# Patient Record
Sex: MALE | Race: WHITE | Employment: UNEMPLOYED | URBAN - METROPOLITAN AREA
[De-identification: names, ages, dates, MRNs, and addresses within clinical notes are randomized per-mention and may not be internally consistent; named-entity substitution may affect disease eponyms.]

---

## 2019-12-06 ENCOUNTER — TRANSFERRED RECORDS (OUTPATIENT)
Dept: HEALTH INFORMATION MANAGEMENT | Facility: CLINIC | Age: 54
End: 2019-12-06

## 2019-12-06 ENCOUNTER — HOSPITAL ENCOUNTER (INPATIENT)
Facility: HOSPITAL | Age: 54
LOS: 6 days | Discharge: HOME | End: 2019-12-12
Attending: PSYCHIATRY & NEUROLOGY | Admitting: PSYCHIATRY & NEUROLOGY
Payer: MEDICAID

## 2019-12-06 DIAGNOSIS — F22 PARANOIA (H): Primary | ICD-10-CM

## 2019-12-06 LAB
ALT SERPL-CCNC: 72 IU/L (ref 6–40)
AST SERPL-CCNC: 153 IU/L (ref 10–40)
CREAT SERPL-MCNC: 0.83 MG/DL (ref 0.7–1.2)
GFR SERPL CREATININE-BSD FRML MDRD: >60 ML/MIN/1.73M2
GLUCOSE SERPL-MCNC: 148 MG/DL (ref 70–99)
POTASSIUM SERPL-SCNC: 3.7 MEQ/L (ref 3.4–5.1)

## 2019-12-06 PROCEDURE — 12400000 ZZH R&B MH

## 2019-12-06 PROCEDURE — 99223 1ST HOSP IP/OBS HIGH 75: CPT | Performed by: NURSE PRACTITIONER

## 2019-12-06 RX ORDER — HYDROXYZINE HYDROCHLORIDE 25 MG/1
25-50 TABLET, FILM COATED ORAL EVERY 4 HOURS PRN
Status: DISCONTINUED | OUTPATIENT
Start: 2019-12-06 | End: 2019-12-12 | Stop reason: HOSPADM

## 2019-12-06 RX ORDER — OLANZAPINE 10 MG/1
10 TABLET ORAL 3 TIMES DAILY PRN
Status: DISCONTINUED | OUTPATIENT
Start: 2019-12-06 | End: 2019-12-12 | Stop reason: HOSPADM

## 2019-12-06 RX ORDER — ALUMINA, MAGNESIA, AND SIMETHICONE 2400; 2400; 240 MG/30ML; MG/30ML; MG/30ML
30 SUSPENSION ORAL EVERY 4 HOURS PRN
Status: DISCONTINUED | OUTPATIENT
Start: 2019-12-06 | End: 2019-12-12 | Stop reason: HOSPADM

## 2019-12-06 RX ORDER — OLANZAPINE 10 MG/2ML
10 INJECTION, POWDER, FOR SOLUTION INTRAMUSCULAR 3 TIMES DAILY PRN
Status: DISCONTINUED | OUTPATIENT
Start: 2019-12-06 | End: 2019-12-12 | Stop reason: HOSPADM

## 2019-12-06 RX ORDER — ACETAMINOPHEN 325 MG/1
650 TABLET ORAL EVERY 4 HOURS PRN
Status: DISCONTINUED | OUTPATIENT
Start: 2019-12-06 | End: 2019-12-12 | Stop reason: HOSPADM

## 2019-12-07 LAB — GLUCOSE BLDC GLUCOMTR-MCNC: 121 MG/DL (ref 70–99)

## 2019-12-07 PROCEDURE — 00000146 ZZHCL STATISTIC GLUCOSE BY METER IP

## 2019-12-07 PROCEDURE — 25000132 ZZH RX MED GY IP 250 OP 250 PS 637: Performed by: NURSE PRACTITIONER

## 2019-12-07 PROCEDURE — 12400000 ZZH R&B MH

## 2019-12-07 RX ORDER — ASPIRIN 81 MG/1
81 TABLET ORAL DAILY
COMMUNITY

## 2019-12-07 RX ORDER — ALBUTEROL SULFATE 90 UG/1
2 AEROSOL, METERED RESPIRATORY (INHALATION) EVERY 4 HOURS PRN
COMMUNITY

## 2019-12-07 RX ORDER — QUETIAPINE FUMARATE 200 MG/1
200 TABLET, FILM COATED ORAL EVERY MORNING
Status: ON HOLD | COMMUNITY
End: 2019-12-12

## 2019-12-07 RX ORDER — ASPIRIN 81 MG/1
81 TABLET ORAL DAILY
Status: DISCONTINUED | OUTPATIENT
Start: 2019-12-07 | End: 2019-12-12 | Stop reason: HOSPADM

## 2019-12-07 RX ORDER — FINASTERIDE 5 MG/1
5 TABLET, FILM COATED ORAL DAILY
COMMUNITY

## 2019-12-07 RX ORDER — BUPROPION HYDROCHLORIDE 300 MG/1
300 TABLET ORAL DAILY
Status: ON HOLD | COMMUNITY
End: 2019-12-12

## 2019-12-07 RX ORDER — DOCUSATE SODIUM 100 MG/1
200 CAPSULE, LIQUID FILLED ORAL 2 TIMES DAILY
Status: ON HOLD | COMMUNITY
End: 2019-12-12

## 2019-12-07 RX ORDER — MIRTAZAPINE 30 MG/1
60 TABLET, FILM COATED ORAL AT BEDTIME
Status: ON HOLD | COMMUNITY
End: 2019-12-12

## 2019-12-07 RX ORDER — ATORVASTATIN CALCIUM 40 MG/1
40 TABLET, FILM COATED ORAL DAILY
COMMUNITY

## 2019-12-07 RX ORDER — RISPERIDONE 3 MG/1
3 TABLET ORAL AT BEDTIME
Status: ON HOLD | COMMUNITY
End: 2019-12-12

## 2019-12-07 RX ORDER — TAMSULOSIN HYDROCHLORIDE 0.4 MG/1
0.8 CAPSULE ORAL DAILY
Status: DISCONTINUED | OUTPATIENT
Start: 2019-12-07 | End: 2019-12-12 | Stop reason: HOSPADM

## 2019-12-07 RX ORDER — CELECOXIB 100 MG/1
200 CAPSULE ORAL 2 TIMES DAILY
Status: DISCONTINUED | OUTPATIENT
Start: 2019-12-07 | End: 2019-12-12 | Stop reason: HOSPADM

## 2019-12-07 RX ORDER — CELECOXIB 200 MG/1
200 CAPSULE ORAL 2 TIMES DAILY
COMMUNITY

## 2019-12-07 RX ORDER — FINASTERIDE 5 MG/1
5 TABLET, FILM COATED ORAL DAILY
Status: DISCONTINUED | OUTPATIENT
Start: 2019-12-07 | End: 2019-12-12 | Stop reason: HOSPADM

## 2019-12-07 RX ORDER — TAMSULOSIN HYDROCHLORIDE 0.4 MG/1
0.8 CAPSULE ORAL DAILY
COMMUNITY

## 2019-12-07 RX ORDER — ATORVASTATIN CALCIUM 40 MG/1
40 TABLET, FILM COATED ORAL DAILY
Status: DISCONTINUED | OUTPATIENT
Start: 2019-12-07 | End: 2019-12-12 | Stop reason: HOSPADM

## 2019-12-07 RX ORDER — CIPROFLOXACIN 500 MG/1
500 TABLET, FILM COATED ORAL 2 TIMES DAILY
Status: ON HOLD | COMMUNITY
Start: 2019-12-04 | End: 2019-12-12

## 2019-12-07 RX ORDER — QUETIAPINE FUMARATE 300 MG/1
600 TABLET, FILM COATED ORAL AT BEDTIME
Status: ON HOLD | COMMUNITY
End: 2019-12-12

## 2019-12-07 RX ADMIN — Medication 1 TABLET: at 20:33

## 2019-12-07 RX ADMIN — OLANZAPINE 10 MG: 10 TABLET, FILM COATED ORAL at 16:05

## 2019-12-07 RX ADMIN — FINASTERIDE 5 MG: 5 TABLET, FILM COATED ORAL at 13:35

## 2019-12-07 RX ADMIN — OMEPRAZOLE 20 MG: 20 CAPSULE, DELAYED RELEASE ORAL at 11:37

## 2019-12-07 RX ADMIN — CELECOXIB 200 MG: 100 CAPSULE ORAL at 11:37

## 2019-12-07 RX ADMIN — ATORVASTATIN CALCIUM 40 MG: 40 TABLET, FILM COATED ORAL at 11:37

## 2019-12-07 RX ADMIN — Medication 1 TABLET: at 11:38

## 2019-12-07 RX ADMIN — HYDROXYZINE HYDROCHLORIDE 50 MG: 25 TABLET, FILM COATED ORAL at 16:05

## 2019-12-07 RX ADMIN — ASPIRIN 81 MG: 81 TABLET, COATED ORAL at 11:44

## 2019-12-07 RX ADMIN — METFORMIN HYDROCHLORIDE 500 MG: 500 TABLET, FILM COATED ORAL at 18:27

## 2019-12-07 RX ADMIN — TAMSULOSIN HYDROCHLORIDE 0.8 MG: 0.4 CAPSULE ORAL at 11:38

## 2019-12-07 ASSESSMENT — ACTIVITIES OF DAILY LIVING (ADL)
SWALLOWING: 0-->SWALLOWS FOODS/LIQUIDS WITHOUT DIFFICULTY
COGNITION: 0 - NO COGNITION ISSUES REPORTED
ORAL_HYGIENE: INDEPENDENT
RETIRED_COMMUNICATION: 0-->UNDERSTANDS/COMMUNICATES WITHOUT DIFFICULTY
HYGIENE/GROOMING: INDEPENDENT
DRESS: 0-->INDEPENDENT
AMBULATION: 0-->INDEPENDENT
FALL_HISTORY_WITHIN_LAST_SIX_MONTHS: YES
TOILETING: 0-->INDEPENDENT
NUMBER_OF_TIMES_PATIENT_HAS_FALLEN_WITHIN_LAST_SIX_MONTHS: 2
DRESS: SCRUBS (BEHAVIORAL HEALTH);INDEPENDENT
TRANSFERRING: 0-->INDEPENDENT
RETIRED_EATING: 0-->INDEPENDENT
BATHING: 0-->INDEPENDENT

## 2019-12-07 NOTE — PLAN OF CARE
"  Problem: Adult Behavioral Health Plan of Care  Goal: Patient-Specific Goal (Individualization)  Description  Patient will sleep 6-8 hours per night.  Patient will eat 50% of meals.   Patient will attend 50% of groups.  Patient will remain free from self harm or injury during hospital stay.   12/7/2019 1626 by Sonia Parks, RN  Outcome: No Change  Note:   Pt irritable at start of the shift, demanding to have his clothes to leave. Pt placed on 72 hr hold at 1640 by provider April P. Pt stated \"I'm fucking pissed off. People are telling me I stole my family's fucking money. I'm freaking out thinking people are trying to kill me. I don't know what the fuck to say or believe. Just get me a gun right now, so I can go boom\" - pt pointing at his head making gun-like hand gesture. Pt contracts for safety on the unit. Speech was delayed - pt endorsed auditory hallucinations.   1605 - Pt was given 10 mg of PRN Zyprexa for agitation and 50 mg of PRN Hydroxyzine for anxiety. Pt much calmer upon reassessment. He ate 75% of supper tonight.     Face to face end of shift report communicated to oncoming RN.      Problem: Suicidal Behavior  Goal: Suicidal Behavior is Absent or Managed  Description  Patient will be free from self harm and injury during hospital stay.     12/7/2019 1626 by Sonia Parks, RN  Outcome: No Change  Note:   Pt made suicidal statements; however, contracts for safety on the unit.      Problem: Fall Injury Risk  Goal: Absence of Fall and Fall-Related Injury  Description  Patient will be free from injury or harm this hospital stay.   Patient will be free from falls during hospital stay.    12/7/2019 1626 by Sonia Parks, RN  Outcome: Improving  Note:   Pt remained free from falls. Gait was balanced and steady.      "
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
## 2019-12-07 NOTE — PLAN OF CARE
ADMISSION NOTE    Reason for admission -Paranoid schizophrenia , off medications.  Safety concerns -none reported.  Risk for or history of violence -none reported.   Full skin assessment: completed    Patient arrived on unit from [facility] accompanied by ambulance crew and security on 12/6/2019  11:08 PM.   Status on arrival: alert, oriented and   See flow sheet for vital signs. Patient given tour of unit and Welcome to  unit papers given to patient, wanding completed, belongings inventoried, and admission assessment started.   Patient's legal status on arrival is 72 hour hold. Appropriate legal rights discussed with and copy given to patient. Patient Bill of Rights discussed with and copy given to patient.   Patient denies SI, HI, and thoughts of self harm and contracts for safety while on unit.      Ruma Mak RN  12/6/2019  11:21 PM

## 2019-12-07 NOTE — PROGRESS NOTES
12/07/19 0058   Patient Belongings   Did you bring any home meds/supplements to the hospital?  Yes   Disposition of meds  Sent to security/pharmacy per site process   Patient Belongings remains with patient   Patient Belongings Remaining with Patient clothing  (boxer pants tshirt belt socks hoodie cig an lighter jacket makeupcase wallet three dollars and 50 cents)   Belongings Search Yes   Clothing Search Yes   Second Staff luke left from afternoons   Comment n/a   List items sent to safe: twenty cash visa card ins.card d.l. s.s.cards ebt card  All other belongings put in assigned cubby in belongings room.       I have reviewed my belongings list on admission and verify that it is correct.     Patient signature_______________________________    Second staff witness (if patient unable to sign) ______________________________       I have received all my belongings at discharge.    Patient signature________________________________    Rea DRAKE   12/7/2019  1:02 AM

## 2019-12-07 NOTE — PLAN OF CARE
"Pt denies SI/HI and endorses chronic auditory hallucinations. PT reports that the voices are \"hard to explain, sometimes they are annoying and tell me to do things.\" PT delayed during conversation, possibly responding to internal stimuli. PT has been refusing meals since arrival. PT reported he \"feels depressed\" and that is why he is not eating. PT educated on diabetic diagnosis and replied \" Oh, I'm diabetic, I guess I forgot. I didn't know that.\" PT then agreed to drink his juice and continue to have milk or juice throughout shift. PT spoke with writer about superficial topics, such as [place] and tattoos but would not expand on personal assessment questions and gave brief answers regarding mental health.     PT denies pain. PT requested shower supplies and showered.   PT calm, with a flat affect.     AM scheduled metformin held r/t patient refusing food and beverages.     PT had /96, PT came out of room, ate lunch and had fluids. PT had scheduled medications.  PT BP was 123/62 on reassessment at 1350  Pt compliant with medication administrations.       Face to face end of shift report communicated to Oncoming RN    Yany Preston RN  12/7/2019  2:32 PM          Problem: Adult Behavioral Health Plan of Care  Goal: Patient-Specific Goal (Individualization)  Description  Patient will sleep 6-8 hours per night.  Patient will eat 50% of meals.   Patient will attend 50% of groups.  Patient will remain free from self harm or injury during hospital stay.   12/7/2019 1039 by Yany Preston, RN  Outcome: No Change  Note:            Problem: Suicidal Behavior  Goal: Suicidal Behavior is Absent or Managed  Description  Patient will be free from self harm and injury during hospital stay.     12/7/2019 1039 by Yany Preston, RN  Outcome: No Change     Problem: Fall Injury Risk  Goal: Absence of Fall and Fall-Related Injury  Description  Patient will be free from injury or harm this hospital stay.   Patient " will be free from falls during hospital stay.    12/7/2019 1039 by Yany Preston, RN  Outcome: Improving

## 2019-12-07 NOTE — H&P
"Psychiatric Eval/H&P    Patient Name: Tobi Jeffery   YOB: 1965  Age: 54 year old  [id number]    Primary Physician: No primary care provider on file.   Completed by ROMULO Hill   none  AR[facility]MS none  Primary psychiatrist/NP none currently states he was seen Dr. Arreaga in the past  Therapist none  Family contact does not wish to give anyone information          CC: \"I do not know why I am here\"    HPI   Tobi Jeffery is a 54 year old  male who was brought in by the [place] police department to the [facility] emergency room after he was kicked out of detox today.  He was initially brought to detox after police have found him walking from [facility] to [place] a few days ago.  Detox believed that his symptoms were secondary to schizophrenia and he has a known diagnosis of this and not due to any intoxication or withdrawal of substance or alcohol.  He had apparently been seeing a local psychiatrist who retired last year and has not been compliant with any of his medications.  He states he does not want to restart his medications because they make his symptoms worse.    He was recently in the [facility] emergency room within the past 2 days due to responding to hallucinationsThough it does not appear that he was admitted anywhere.  The [facility] emergency room physician thought there was a possibility that was methamphetamine intoxication though he denies any methamphetamine use.     He was cooperative in the [place] emergency room though was appearing very preoccupied.         When I met with Tobi he laid in bed and almost made no eye contact with me.  He stared straight at the ceiling while he was laying in bed.  He appeared to have thought blocking though was able to answer some questions in complete sentence though this did not occur frequently.  He did not initiate any conversation at all.  He did not ask me any questions or contribute to any part of the " "conversation.  Staff did tell me that he had not eaten since he has been here in his breakfast was still sitting his side table.  I asked him if he was hearing voices telling him not to eat and he said that he was.  I asked him if he was hearing voices telling him to kill himself and he said that he was.  He denies having intent to harm himself.  At one point he does state \"I am not doing too well\" he could not elaborate on this at all.  He is extremely blunted.  I asked him if he had like the past psychiatrist that he had seen.  It appears that he did not follow-up with anyone else after this psychiatrist retired.  He states \"yeah he was all right\" I asked if he like the medications he was on when he saw that psychiatrist and he said yes.  I asked if we could restart those medications he was on in the past and he said \"no they made me worse\" I asked if they made his voices seem worse and he stated yes.  It appears at one point he was on Wellbutrin which could have contributed to her worsening of the voices.  It appears he was also on Risperdal and Seroquel though he has not taken them in quite some time at least a year.  I asked him if he believes the voices that he is hearing could be secondary to mental illness and he stated \"I am not sure\".  He does state that he drinks alcohol though when I asked him how frequently he drinks he does not answer.  I asked him if he drinks at least weekly and he says yes.  His liver enzymes are elevated and he states that he has never been diagnosed with hepatitis when I asked him.  I asked if his liver enzymes could be elevated from alcohol and he says \"maybe\".  I asked him if he could think of any way that I could help him and he said \"no you can help me\" I said that there is a possibility medications could help some of his symptoms such as the voices and he said that there is \"nothing that can help me\" he looks quite anxious and preoccupied when he stares at the ceiling.  He " "denies any thoughts to harm other people.     Past Psychiatric History:      He has not signed a release of information for care everywhere and I am unable to see if there is any past hospitalizations for mental health.  He has no hospitalizations here in [facility] or under the media tab in the scanned charts from old history.    He does tell me that he has been under civil commitments \"maybe twice\" I asked him if he had ever been to [facility] and he believes that he has.  I asked if I could get him to sign a release so I could obtain those records and he agreed to do this though I am concerned he will be too paranoid to sign the paper.      Social History:     He was most recently living in [place] that was unable to return there.  I am unsure who he was living with.  I believe he is on disability.  It is unclear if he has any family in [place] area.  He will not give us any family members names and has listed himself as confidential    Chemical Use History: He drinks at least weekly per his report though for the past few days he was in detox as the police believed he was intoxicated or withdrawing and detox had him sent here because they did not believe he had any symptoms that his symptoms were secondary to schizophrenia    He does smoke marijuana     Family Psychiatric History: Unknown     Medical History and ROS    Prior to Admission medications    Not on File     Allergies not on file  No past medical history on file.  No past surgical history on file.    Current Medications none    Past Psychiatric Medications Tried Risperdal and Seroquel and Wellbutrin \"made my symptoms worse        Physical Exam     Constitutional: oriented to person, place, and time.  appears well-developed and well-nourished.   HENT:   Head: Normocephalic and atraumatic.   Right Ear: External ear normal.   Left Ear: External ear normal.   Nose: Nose normal.   Mouth/Throat: Oropharynx is clear and moist. No " oropharyngeal exudate.   Eyes: Conjunctivae and EOM are normal. Pupils are equal, round, and reactive to light. Right eye exhibits no discharge. Left eye exhibits no discharge. No scleral icterus.   Neck: Normal range of motion. Neck supple. No JVD present. No tracheal deviation present. No thyromegaly present.   Cardiovascular: Normal rate, regular rhythm, normal heart sounds and intact distal pulses. Exam reveals no gallop and no friction rub.   No murmur heard.  Pulmonary/Chest: Effort normal and breath sounds normal. No stridor. No respiratory distress.  no wheezes. no rales. no tenderness.   Abdominal: Soft. Bowel sounds are normal.  no distension and no mass. There is no tenderness. There is no rebound and no guarding.  Skin: Dry, intact, no open areas, rashes, moles of concern        Review of Systems:  A 10 point review of systems was negative though he is an extremely poor historian         MSE/PSYCH  PSYCHIATRIC EXAM  There were no vitals taken for this visit.  -Appearance/Behavior: Extremely disheveled and malodorous    -Motor: Psychomotor retardation  -Gait: I did not observe his gait he was laying in bed  -Abnormal involuntary movements: None noted  -Mood: Constricted though anxious at times  -Affect: Blunted  -Speech: Very delayed and monotone does not offer much in conversation        -Thought process/associations: Thought blocking.  -Thought content: paranoid delusions  -Perceptual disturbances: Frequent hallucinations..              -Suicidal/Homicidal Ideation: Suicidal thoughts with no intent denies homicidal thoughts  -Judgment: Poor.  -Insight: Poor.  *Orientation: time and person.  *Memory: Fair  *Attention: Limited  *Language: fluent, no aphasias, able to repeat phrases and name objects. Vocab intact.  *Fund of information: Low average  *Cognitive functioning estimate: 2 - very impaired.     Labs: His AST was 153 his ALT was 72.  Both elevated.  His glucose is slightly elevated at 148 with  "this was not fasting.  His white blood cell count was also slightly elevated at 11.2 though does not have any symptoms of infection     Assessment/Impression: This is a 54 year old yo male who was brought in by local police from detox.  They had brought him to detox 3 days ago and detox staff did not believe that his behaviors were secondary to any substance intoxication or withdrawal and did tell police to bring him in for a psychiatric evaluation.  He has been disorganized and responding to internal stimuli.  He does have a history of schizophrenia and has not been on his medications for approximately a year possibly more.  He is a very poor historian and answers minimally.  He is difficult to assess.  He is extremely malodorous and has not taken care of himself.  He is hearing voices telling him not to eat as well as kill himself though he does not have intent.  He makes no eye contact and offers almost nothing in conversation or answers.  Initially he states the medications used to be on were \"okay\" though he then states that they made him worse would not elaborate on this.  He is listed himself as a do not acknowledge and will not give me any family to contact.  He is homeless and having command hallucinations he is unable to take care of himself and I did tell him there is a high possibility that I may have to fill out a petition for commitment and have him screened by the [facility] due to my concerns for his safety.    Educated regarding medication indications, risks, benefits, side effects, contraindications and possible interactions. Verbally expressed understanding.     DX:      Unspecified psychotic disorder  Rule out schizophrenia  Rule out schizoaffective disorder, bipolar type  Alcohol use disorder moderate to severe  Marijuana use disorder moderate to severe    Plan:     Labs Needed:    Hep c will be ordered      Med Changes:  I would like to restart Risperdal though he states that the medications \"made " "him worse\".  He does not want to take any medications.  I will have some as needed Ativan and Zyprexa available and nursing is offering Zyprexa on an as-needed basis at this time        DC Planning:  High likelihood I will have to fill out a petition for civil commitment due to his command hallucinations telling him to kill himself as well as his inability to care for himself at all.      Anticipated length of stay greater than 5 days       "

## 2019-12-08 LAB — GLUCOSE BLDC GLUCOMTR-MCNC: 120 MG/DL (ref 70–99)

## 2019-12-08 PROCEDURE — 25000132 ZZH RX MED GY IP 250 OP 250 PS 637: Performed by: NURSE PRACTITIONER

## 2019-12-08 PROCEDURE — 00000146 ZZHCL STATISTIC GLUCOSE BY METER IP

## 2019-12-08 PROCEDURE — 12400000 ZZH R&B MH

## 2019-12-08 PROCEDURE — 99233 SBSQ HOSP IP/OBS HIGH 50: CPT | Performed by: NURSE PRACTITIONER

## 2019-12-08 RX ORDER — ALBUTEROL SULFATE 0.83 MG/ML
2.5 SOLUTION RESPIRATORY (INHALATION) EVERY 4 HOURS PRN
Status: DISCONTINUED | OUTPATIENT
Start: 2019-12-08 | End: 2019-12-12 | Stop reason: HOSPADM

## 2019-12-08 RX ADMIN — ATORVASTATIN CALCIUM 40 MG: 40 TABLET, FILM COATED ORAL at 08:02

## 2019-12-08 RX ADMIN — METFORMIN HYDROCHLORIDE 500 MG: 500 TABLET, FILM COATED ORAL at 08:02

## 2019-12-08 RX ADMIN — FLUTICASONE FUROATE AND VILANTEROL TRIFENATATE 1 PUFF: 100; 25 POWDER RESPIRATORY (INHALATION) at 10:27

## 2019-12-08 RX ADMIN — METFORMIN HYDROCHLORIDE 500 MG: 500 TABLET, FILM COATED ORAL at 17:44

## 2019-12-08 RX ADMIN — OMEPRAZOLE 20 MG: 20 CAPSULE, DELAYED RELEASE ORAL at 08:02

## 2019-12-08 RX ADMIN — ASPIRIN 81 MG: 81 TABLET, COATED ORAL at 08:02

## 2019-12-08 RX ADMIN — Medication 1 TABLET: at 21:34

## 2019-12-08 RX ADMIN — HYDROXYZINE HYDROCHLORIDE 50 MG: 25 TABLET, FILM COATED ORAL at 18:15

## 2019-12-08 RX ADMIN — TAMSULOSIN HYDROCHLORIDE 0.8 MG: 0.4 CAPSULE ORAL at 08:02

## 2019-12-08 RX ADMIN — OLANZAPINE 10 MG: 10 TABLET, FILM COATED ORAL at 18:15

## 2019-12-08 RX ADMIN — CELECOXIB 200 MG: 100 CAPSULE ORAL at 21:34

## 2019-12-08 RX ADMIN — FINASTERIDE 5 MG: 5 TABLET, FILM COATED ORAL at 08:02

## 2019-12-08 RX ADMIN — Medication 1 TABLET: at 08:02

## 2019-12-08 RX ADMIN — CELECOXIB 200 MG: 100 CAPSULE ORAL at 08:02

## 2019-12-08 ASSESSMENT — MIFFLIN-ST. JEOR: SCORE: 1910.84

## 2019-12-08 ASSESSMENT — ACTIVITIES OF DAILY LIVING (ADL)
LAUNDRY: UNABLE TO COMPLETE
DRESS: SCRUBS (BEHAVIORAL HEALTH);INDEPENDENT
HYGIENE/GROOMING: INDEPENDENT
ORAL_HYGIENE: INDEPENDENT
ORAL_HYGIENE: INDEPENDENT
DRESS: SCRUBS (BEHAVIORAL HEALTH);INDEPENDENT
HYGIENE/GROOMING: INDEPENDENT

## 2019-12-08 NOTE — PROGRESS NOTES
"[facility]  Psychiatric Progress Note      Impression:     Tobi is more cooperative and he is less delayed in his speech today.  He tells me that he is ready to start taking medication again.  He tells me that his voices are not as bad as they were yesterday.  He does admit to using methamphetamines a few days prior to coming in and does believe they made his hallucinations and \"racing thoughts\" worse.  He states the hallucinations are still there though they are not as bad.  They are no longer telling him not to eat.  He has been eating since last night.  Last night he did demand to leave and I did have to place him on a 72-hour hold.  His affect is still very flat and he is delayed in his speech.  His thought blocking has improved a bit though he is still quite delayed.  He still endorses suicidal thoughts though states it is better than it was and has no intent or plan.  We discussed his previous living situation and he states that he has lived in [facility] [place] [facility] in the past and he is hoping that there is one in [place] that he can move to.  He does have friends and knows [place] very well.    Educated regarding medication indications, risks, benefits, side effects, contraindications and possible interactions. Verbally expressed understanding.        DIagnoses:   Unspecified psychotic disorder  Rule out schizophrenia  Rule out schizoaffective disorder, bipolar type  Methamphetamine use dsiorder, moderate  Alcohol use disorder moderate to severe  Marijuana use disorder moderate to severe      Attestation:  Patient has been seen and evaluated by me,  Jackie Barker NP          Interim History:   The patient's care was discussed with the treatment team and chart notes were reviewed.          Medications:     Current Facility-Administered Medications Ordered in Epic   Medication Dose Route Frequency Last Rate Last Dose     acetaminophen (TYLENOL) tablet 650 mg  650 mg " "Oral Q4H PRN         albuterol (PROVENTIL) neb solution 2.5 mg  2.5 mg Nebulization Q4H PRN         alum & mag hydroxide-simethicone (MYLANTA ES/MAALOX  ES) suspension 30 mL  30 mL Oral Q4H PRN         aspirin EC tablet 81 mg  81 mg Oral Daily   81 mg at 12/08/19 0802     atorvastatin (LIPITOR) tablet 40 mg  40 mg Oral Daily   40 mg at 12/08/19 0802     calcium carbonate 600 mg-vitamin D 400 units (CALTRATE) per tablet 1 tablet  1 tablet Oral BID   1 tablet at 12/08/19 0802     celecoxib (celeBREX) capsule 200 mg  200 mg Oral BID   200 mg at 12/08/19 0802     finasteride (PROSCAR) tablet 5 mg  5 mg Oral Daily   5 mg at 12/08/19 0802     fluticasone-vilanterol (BREO ELLIPTA) 100-25 MCG/INH inhaler 1 puff  1 puff Inhalation Daily   1 puff at 12/08/19 1027     hydrOXYzine (ATARAX) tablet 25-50 mg  25-50 mg Oral Q4H PRN   50 mg at 12/07/19 1605     magnesium hydroxide (MILK OF MAGNESIA) suspension 30 mL  30 mL Oral At Bedtime PRN         metFORMIN (GLUCOPHAGE) tablet 500 mg  500 mg Oral BID w/meals   500 mg at 12/08/19 0802     nicotine (NICORETTE) gum 2-4 mg  2-4 mg Buccal Q1H PRN         OLANZapine (zyPREXA) injection 10 mg  10 mg Intramuscular TID PRN        Or     OLANZapine (zyPREXA) tablet 10 mg  10 mg Oral TID PRN   10 mg at 12/07/19 1605     omeprazole (priLOSEC) CR capsule 20 mg  20 mg Oral Daily   20 mg at 12/08/19 0802     tamsulosin (FLOMAX) capsule 0.8 mg  0.8 mg Oral Daily   0.8 mg at 12/08/19 0802     No current Epic-ordered outpatient medications on file.              10 point ROS negative        Allergies:   No Known Allergies         Psychiatric Examination:   /86   Pulse 86   Temp 96.9  F (36.1  C) (Tympanic)   Resp 16   Ht 1.727 m (5' 8\")   Wt 109.6 kg (241 lb 11.2 oz)   SpO2 95%   BMI 36.75 kg/m    Weight is 241 lbs 11.2 oz  Body mass index is 36.75 kg/m .    Appearance:  awake, alert and slightly unkempt  Attitude:  guarded  Eye Contact:  fair  Mood:  depressed  Affect:  intensity is " blunted  Speech:  decreased prosody  Psychomotor Behavior:  no evidence of tardive dyskinesia, dystonia, or tics  Thought Process:  evidence of thought blocking present  Associations:  no loose associations  Thought Content:  passive suicidal ideation present and auditory hallucinations present  Insight:  limited  Judgment:  poor  Oriented to:  time, person, and place  Attention Span and Concentration:  limited  Recent and Remote Memory:  fair  Fund of Knowledge: low-normal  Muscle Strength and Tone: normal  Gait and Station: Normal           Labs:   None needed at this time           Plan:     Start risperdal 1 mg hs

## 2019-12-08 NOTE — PLAN OF CARE
Face to face shift report received from Sonia FERNANDO RN.       Problem: Adult Behavioral Health Plan of Care  Goal: Patient-Specific Goal (Individualization)  Description  Patient will sleep 6-8 hours per night.  Patient will eat 50% of meals.   Patient will attend 50% of groups.  Patient will remain free from self harm or injury during hospital stay.   12/8/2019 0307 by Latanya Gavin RN  Outcome: No Change  Note: Celebrex not given due to pt being asleep. Pt has been in bed with eyes closed and regular respirations. Slept 7 hours this shift. 15 minute and PRN checks all night. No complaints offered. Denies that he is and does not appear to be experiencing any symptoms of withdrawal. Will continue to monitor. Accucheck this .          Problem: Cognitive Impairment (Psychotic Signs/Symptoms)  Goal: Improved Thought Clarity and Organization (Psychotic Signs/Symptoms)  Description  Patient will be able to participate in reality based conversation.    Outcome: No Change       Problem: Suicidal Behavior  Goal: Suicidal Behavior is Absent or Managed  Description  Patient will be free from self harm and injury during hospital stay.     12/8/2019 0307 by Latanya Gavin RN  Outcome: Improving  Free from self harm and injury this shift.      Problem: Fall Injury Risk  Goal: Absence of Fall and Fall-Related Injury  Description  Patient will be free from injury or harm this hospital stay.   Patient will be free from falls during hospital stay.    12/8/2019 0307 by Latanya Gavin RN  Outcome: Improving   Free from falls this shift.     Face to face end of shift report to be communicated to oncoming RN.

## 2019-12-08 NOTE — PLAN OF CARE
"The beginning of shift patient was carrying around his hold paperwork and asking floor staff to leave. PT has not asked since and put paper back     PT has been quiet and withdrawn throughout shift but out in the lounge for meals and in the lounge watching football. PT has a blunt affect but appears less irritable than yesterday. PT also attended group passively. PT is brief with assessment questions. PT does not speak/initiate or participate in conversation unless spoken to or questioned. PT endorses chronic auditory voices that \"are ok, I guess.\" today. PT reports feeling suicidal but unable to explain why or define a plan and agrees to safety plan. PT is calm, cooperative with medications.    PT has no c/o pain,  PT steady and balanced on feet. PT removed fall band and refused replacement fall band.   PT showered.     Face to face end of shift report communicated to oncoming RN    Yany Preston RN  12/8/2019  2:31 PM                Problem: Adult Behavioral Health Plan of Care  Goal: Patient-Specific Goal (Individualization)  Description  Patient will sleep 6-8 hours per night.  Patient will eat 50% of meals.   Patient will attend 50% of groups.  Patient will remain free from self harm or injury during hospital stay.   12/8/2019 1245 by Yany Preston, RN  Outcome: Improving  Note:          Problem: Suicidal Behavior  Goal: Suicidal Behavior is Absent or Managed  Description  Patient will be free from self harm and injury during hospital stay.     12/8/2019 1245 by Yany Preston, RN  Outcome: Improving     Problem: Fall Injury Risk  Goal: Absence of Fall and Fall-Related Injury  Description  Patient will be free from injury or harm this hospital stay.   Patient will be free from falls during hospital stay.    12/8/2019 1245 by Yany Preston, RN  Outcome: Improving     "

## 2019-12-09 LAB — GLUCOSE BLDC GLUCOMTR-MCNC: 125 MG/DL (ref 70–99)

## 2019-12-09 PROCEDURE — 25000132 ZZH RX MED GY IP 250 OP 250 PS 637: Performed by: NURSE PRACTITIONER

## 2019-12-09 PROCEDURE — 12400000 ZZH R&B MH

## 2019-12-09 PROCEDURE — 00000146 ZZHCL STATISTIC GLUCOSE BY METER IP

## 2019-12-09 RX ORDER — RISPERIDONE 0.5 MG/1
0.5 TABLET ORAL 2 TIMES DAILY
Status: DISCONTINUED | OUTPATIENT
Start: 2019-12-09 | End: 2019-12-10

## 2019-12-09 RX ADMIN — RISPERIDONE 0.5 MG: 0.5 TABLET ORAL at 20:37

## 2019-12-09 RX ADMIN — METFORMIN HYDROCHLORIDE 500 MG: 500 TABLET, FILM COATED ORAL at 09:02

## 2019-12-09 RX ADMIN — CELECOXIB 200 MG: 100 CAPSULE ORAL at 09:02

## 2019-12-09 RX ADMIN — Medication 1 TABLET: at 20:37

## 2019-12-09 RX ADMIN — ATORVASTATIN CALCIUM 40 MG: 40 TABLET, FILM COATED ORAL at 09:02

## 2019-12-09 RX ADMIN — FINASTERIDE 5 MG: 5 TABLET, FILM COATED ORAL at 09:51

## 2019-12-09 RX ADMIN — ASPIRIN 81 MG: 81 TABLET, COATED ORAL at 09:03

## 2019-12-09 RX ADMIN — TAMSULOSIN HYDROCHLORIDE 0.8 MG: 0.4 CAPSULE ORAL at 09:03

## 2019-12-09 RX ADMIN — CELECOXIB 200 MG: 100 CAPSULE ORAL at 20:37

## 2019-12-09 RX ADMIN — FLUTICASONE FUROATE AND VILANTEROL TRIFENATATE 1 PUFF: 100; 25 POWDER RESPIRATORY (INHALATION) at 09:05

## 2019-12-09 RX ADMIN — Medication 1 TABLET: at 09:02

## 2019-12-09 RX ADMIN — RISPERIDONE 0.5 MG: 0.5 TABLET ORAL at 09:03

## 2019-12-09 RX ADMIN — OMEPRAZOLE 20 MG: 20 CAPSULE, DELAYED RELEASE ORAL at 09:02

## 2019-12-09 ASSESSMENT — ACTIVITIES OF DAILY LIVING (ADL)
DRESS: SCRUBS (BEHAVIORAL HEALTH);INDEPENDENT
DRESS: SCRUBS (BEHAVIORAL HEALTH)
LAUNDRY: UNABLE TO COMPLETE
HYGIENE/GROOMING: INDEPENDENT
ORAL_HYGIENE: INDEPENDENT
ORAL_HYGIENE: INDEPENDENT
LAUNDRY: UNABLE TO COMPLETE
HYGIENE/GROOMING: INDEPENDENT

## 2019-12-09 NOTE — PLAN OF CARE
Problem: Adult Behavioral Health Plan of Care  Goal: Patient-Specific Goal (Individualization)  Description  Patient will sleep 6-8 hours per night.  Patient will eat 50% of meals.   Patient will attend 50% of groups.  Patient will remain free from self harm or injury during hospital stay.   12/8/2019 1830 by Sonia Parks, RN  Outcome: No Change  Note:   Pt presented as calm and cooperative prior to supper; however, affect switched to tense and irritable after supper. Pt pacing in the hallway repeatedly asking writer to have his clothes to leave. Pt was reminded several times that he is on a 72 hour hold until 12/12 and that he needs to remain hospitalized until then. Pt was given 50 mg of PRN Hydroxyzine at 1815 along with 10 mg of PRN Zyprexa - pt much calmer upon reassessment. Pt had confused thinking and thought blocking tonight. He denied suicidal ideation; however, continues to endorse auditory hallucinations. He has been up on the unit throughout the shift, but remains withdrawn. He did not participate in any group programing tonight. He ate 75% of his supper and was compliant with scheduled medications.     Face to face end of shift report communicated to oncoming RN.      Problem: Fall Injury Risk  Goal: Absence of Fall and Fall-Related Injury  Description  Patient will be free from injury or harm this hospital stay.   Patient will be free from falls during hospital stay.    12/8/2019 1830 by Sonia Parks, RN  Outcome: Improving  Note:   Pt remained free from falls. Gait was balanced and steady.

## 2019-12-09 NOTE — PLAN OF CARE
Face to face shift report received from Sonia FERNANDO RN.       Problem: Adult Behavioral Health Plan of Care  Goal: Patient-Specific Goal (Individualization)  Description  Patient will sleep 6-8 hours per night.  Patient will eat 50% of meals.   Patient will attend 50% of groups.  Patient will remain free from self harm or injury during hospital stay.   12/9/2019 0008 by Latanya Gavin RN  Outcome: No Change  Note: Pt has been in bed with eyes closed and regular respirations. Slept 7 hours this shift. 15 minute and PRN checks all night. No complaints offered. Will continue to monitor. Accucheck this .           Problem: Suicidal Behavior  Goal: Suicidal Behavior is Absent or Managed  Description  Patient will be free from self harm and injury during hospital stay.     12/9/2019 0008 by Latanya Gavin RN  Outcome: Improving  Free from self harm and injury this shift.      Problem: Fall Injury Risk  Goal: Absence of Fall and Fall-Related Injury  Description  Patient will be free from injury or harm this hospital stay.   Patient will be free from falls during hospital stay.    12/9/2019 0008 by Latanya Gavin RN  Outcome: Improving      Free from falls this shift.       Face to face end of shift report to be communicated to oncoming RN.

## 2019-12-09 NOTE — PLAN OF CARE
BEHAVIORAL TEAM DISCUSSION    Participants: Quan Torres RN, Nitish Unger RN, Edith Hudson LSW, Latanya Van LSW, Nik Cisse LSW, Lena Fleming RN, Wandy Birimngham OT, Edilma Collins OT, Joaquim [facility]  Progress: fair  Continued Stay Criteria/Rationale: flat, delayed, thought blocking, hallucinations  Medical/Physical: diabetes  Precautions:   Falls precaution?: YES, care plan in place  Behavioral Orders   Procedures     Code 1 - Restrict to Unit     Routine Programming     As clinically indicated     Status 15     Every 15 minutes.     Plan: restarted Risperdal, filing out hold paperwork  Rationale for change in precautions or plan: None    Active Problems:    Paranoia (H)        Current Facility-Administered Medications:      acetaminophen (TYLENOL) tablet 650 mg, 650 mg, Oral, Q4H PRN, Britt Raphael NP     albuterol (PROVENTIL) neb solution 2.5 mg, 2.5 mg, Nebulization, Q4H PRN, Jackie Barker NP     alum & mag hydroxide-simethicone (MYLANTA ES/MAALOX  ES) suspension 30 mL, 30 mL, Oral, Q4H PRN, Britt Raphael NP     aspirin EC tablet 81 mg, 81 mg, Oral, Daily, Jackie Barker NP, 81 mg at 12/09/19 0903     atorvastatin (LIPITOR) tablet 40 mg, 40 mg, Oral, Daily, Jackie Barker NP, 40 mg at 12/09/19 0902     calcium carbonate 600 mg-vitamin D 400 units (CALTRATE) per tablet 1 tablet, 1 tablet, Oral, BID, Jackie Barker NP, 1 tablet at 12/09/19 0902     celecoxib (celeBREX) capsule 200 mg, 200 mg, Oral, BID, Jackie Barker NP, 200 mg at 12/09/19 0902     finasteride (PROSCAR) tablet 5 mg, 5 mg, Oral, Daily, Jackie Barker NP, 5 mg at 12/08/19 0802     fluticasone-vilanterol (BREO ELLIPTA) 100-25 MCG/INH inhaler 1 puff, 1 puff, Inhalation, Daily, Jackie Barker NP, 1 puff at 12/09/19 0905     hydrOXYzine (ATARAX) tablet 25-50 mg, 25-50 mg, Oral, Q4H PRN, Britt Raphael, NP, 50 mg at 12/08/19 1815     magnesium hydroxide (MILK  OF MAGNESIA) suspension 30 mL, 30 mL, Oral, At Bedtime PRN, Britt Raphael NP     metFORMIN (GLUCOPHAGE) tablet 500 mg, 500 mg, Oral, BID w/meals, Jackie Barker NP, 500 mg at 12/09/19 0902     nicotine (NICORETTE) gum 2-4 mg, 2-4 mg, Buccal, Q1H PRN, Britt Raphael NP     OLANZapine (zyPREXA) injection 10 mg, 10 mg, Intramuscular, TID PRN **OR** OLANZapine (zyPREXA) tablet 10 mg, 10 mg, Oral, TID PRN, Britt Raphael NP, 10 mg at 12/08/19 1815     omeprazole (priLOSEC) CR capsule 20 mg, 20 mg, Oral, Daily, Jackie Barker NP, 20 mg at 12/09/19 0902     risperiDONE (risperDAL) tablet 0.5 mg, 0.5 mg, Oral, BID, Jackie Barker NP, 0.5 mg at 12/09/19 0903     tamsulosin (FLOMAX) capsule 0.8 mg, 0.8 mg, Oral, Daily, Jackie Barker NP, 0.8 mg at 12/09/19 0903

## 2019-12-09 NOTE — PLAN OF CARE
Problem: Adult Behavioral Health Plan of Care  Goal: Patient-Specific Goal (Individualization)  Description  Patient will sleep 6-8 hours per night.  Patient will eat 50% of meals.   Patient will attend 50% of groups.  Patient will remain free from self harm or injury during hospital stay.   12/9/2019 1337 by Savanah Trejo, RN  Outcome: No Change  Note:   Shift Summery: VSS, denies pain. Denies SI, SIB, HI or hallucinations--Appears responding to internal stimuli. Isolative and withdrawn this shift--out for meals only. Depressed appearing states he is worried his family is angry and upset with him. Pt states he has not tried calling them yet.   Hesitant to talk with this writer-gives short answers to assessment questions. Little eye contact-quick glances.        Problem: Suicidal Behavior  Goal: Suicidal Behavior is Absent or Managed  Description  Patient will be free from self harm and injury during hospital stay.     12/9/2019 1337 by Savanah Trejo, RN  Outcome: Improving     Problem: Fall Injury Risk  Goal: Absence of Fall and Fall-Related Injury  Description  Patient will be free from injury or harm this hospital stay.   Patient will be free from falls during hospital stay.    12/9/2019 1337 by Savanah Trejo, RN  Outcome: Improving    Face to face end of shift report communicated to oncoming shift.     Savanah Trejo RN  12/9/2019  1:43 PM

## 2019-12-10 PROCEDURE — 25000132 ZZH RX MED GY IP 250 OP 250 PS 637: Performed by: NURSE PRACTITIONER

## 2019-12-10 PROCEDURE — 99233 SBSQ HOSP IP/OBS HIGH 50: CPT | Performed by: NURSE PRACTITIONER

## 2019-12-10 PROCEDURE — 12400000 ZZH R&B MH

## 2019-12-10 RX ADMIN — CELECOXIB 200 MG: 100 CAPSULE ORAL at 20:55

## 2019-12-10 RX ADMIN — HYDROXYZINE HYDROCHLORIDE 50 MG: 25 TABLET, FILM COATED ORAL at 16:50

## 2019-12-10 RX ADMIN — METFORMIN HYDROCHLORIDE 500 MG: 500 TABLET, FILM COATED ORAL at 08:22

## 2019-12-10 RX ADMIN — METFORMIN HYDROCHLORIDE 500 MG: 500 TABLET, FILM COATED ORAL at 16:41

## 2019-12-10 RX ADMIN — ASPIRIN 81 MG: 81 TABLET, COATED ORAL at 08:29

## 2019-12-10 RX ADMIN — FINASTERIDE 5 MG: 5 TABLET, FILM COATED ORAL at 08:22

## 2019-12-10 RX ADMIN — RISPERIDONE 1.5 MG: 1 TABLET ORAL at 20:55

## 2019-12-10 RX ADMIN — OMEPRAZOLE 20 MG: 20 CAPSULE, DELAYED RELEASE ORAL at 08:21

## 2019-12-10 RX ADMIN — FLUTICASONE FUROATE AND VILANTEROL TRIFENATATE 1 PUFF: 100; 25 POWDER RESPIRATORY (INHALATION) at 08:22

## 2019-12-10 RX ADMIN — RISPERIDONE 0.5 MG: 0.5 TABLET ORAL at 08:22

## 2019-12-10 RX ADMIN — Medication 1 TABLET: at 20:55

## 2019-12-10 RX ADMIN — ATORVASTATIN CALCIUM 40 MG: 40 TABLET, FILM COATED ORAL at 08:21

## 2019-12-10 RX ADMIN — CELECOXIB 200 MG: 100 CAPSULE ORAL at 08:21

## 2019-12-10 RX ADMIN — TAMSULOSIN HYDROCHLORIDE 0.8 MG: 0.4 CAPSULE ORAL at 08:21

## 2019-12-10 RX ADMIN — Medication 1 TABLET: at 08:22

## 2019-12-10 ASSESSMENT — ACTIVITIES OF DAILY LIVING (ADL)
HYGIENE/GROOMING: INDEPENDENT
DRESS: SCRUBS (BEHAVIORAL HEALTH)
ORAL_HYGIENE: INDEPENDENT

## 2019-12-10 NOTE — PLAN OF CARE
"PT withdrawn and isolated in room throughout shift. PT refused meals, refused snacks. PT drank 8oz grape juice with medication administration.   PT denies pain.   When asked about auditory hallucinations/voices \"They are always causing me problems.\"   Yesterday when assessed about hallucinations patient denied that they were causing me problems.   PT appeared agitated when responding to assessment questions.              Problem: Adult Behavioral Health Plan of Care  Goal: Patient-Specific Goal (Individualization)  Description  Patient will sleep 6-8 hours per night.  Patient will eat 50% of meals.   Patient will attend 50% of groups.  Patient will remain free from self harm or injury during hospital stay.   12/9/2019 2024 by Yany Preston, RN  Outcome: No Change     Problem: Suicidal Behavior  Goal: Suicidal Behavior is Absent or Managed  Description  Patient will be free from self harm and injury during hospital stay.     12/9/2019 2024 by Yany Preston, RN  Outcome: No Change     Problem: Fall Injury Risk  Goal: Absence of Fall and Fall-Related Injury  Description  Patient will be free from injury or harm this hospital stay.   Patient will be free from falls during hospital stay.    12/9/2019 2024 by Yany Preston, RN  Outcome: Improving     "

## 2019-12-10 NOTE — PLAN OF CARE
Problem: Adult Behavioral Health Plan of Care  Goal: Patient-Specific Goal (Individualization)  Description  Patient will sleep 6-8 hours per night.  Patient will eat 50% of meals.   Patient will attend 50% of groups.  Patient will remain free from self harm or injury during hospital stay.   12/10/2019 1711 by Savanah Trejo, RN  Outcome: No Change  Note:   Shift Summery:  VSS, denies pain. Denies SI, SIB, HI. Endorses auditory hallucinations but declines to talk about them. Remains paranoid-states he is worried his dad will find out where he is and his dad is a gangster so he can't talk to me about it or I could be in danger as well. Endorses anxiety and depression. Hydroxyzine 50 mg rec'd.   States he is unsure what happened the night he came to our hospital but he was [facility].   States he is feeling better and is going to be more cooperative. States he may try to call family member to let them know where he is but later decides not to.   [facility]x [facility]. Attends and participates in RN group. Little interaction with peers.       Problem: Cognitive Impairment (Psychotic Signs/Symptoms)  Goal: Improved Thought Clarity and Organization (Psychotic Signs/Symptoms)  Description  Patient will be able to participate in reality based conversation.    12/10/2019 1711 by Savanah Trejo, RN  Outcome: No Change     Problem: Suicidal Behavior  Goal: Suicidal Behavior is Absent or Managed  Description  Patient will be free from self harm and injury during hospital stay.     12/10/2019 1711 by Savanah Trejo, RN  Outcome: Improving     Problem: Fall Injury Risk  Goal: Absence of Fall and Fall-Related Injury  Description  Patient will be free from injury or harm this hospital stay.   Patient will be free from falls during hospital stay.    12/10/2019 1711 by Savanah Trejo, RN  Outcome: Improving     Face to face end of shift report communicated to oncoming shift.      Savanah Trejo RN  12/10/2019  5:19 PM

## 2019-12-10 NOTE — PROGRESS NOTES
"[facility]  Psychiatric Progress Note      Impression:     Tobi is much more irritable than when I last saw him. He was more easy to engage with and his speech was not as delayed as it is today. He appears upset. I asked him if anything was bothering him and he stated angirly \"im ready to leave\". The last time we spoke he was open to staying here until he stabilized and stated he did not believe he had a place to go to in reference to a place ot live. We discussed a possibility of a board and lodge and he was interested in this before and now he is not. I asked him if he has a plce to live and he states \"well I better because I was paying the bills\". He appears very preoccupied though when I asked him if he was hearing voices he quickly says \"no\"  risperdal was restarted and he is taking it. It does need to be increased. Will increase it today. He stared at the ceiling the entire time I was talking to him. He is malodorous and very disheveled.     Educated regarding medication indications, risks, benefits, side effects, contraindications and possible interactions. Verbally expressed understanding.        DIagnoses:    schizophrenia  Rule out schizoaffective disorder, bipolar type  Methamphetamine use dsiorder, moderate  Alcohol use disorder moderate to severe  Marijuana use disorder moderate to severe      Attestation:  Patient has been seen and evaluated by me,  Jackie Barker NP          Interim History:   The patient's care was discussed with the treatment team and chart notes were reviewed.          Medications:     Current Facility-Administered Medications Ordered in Epic   Medication Dose Route Frequency Last Rate Last Dose     acetaminophen (TYLENOL) tablet 650 mg  650 mg Oral Q4H PRN         albuterol (PROVENTIL) neb solution 2.5 mg  2.5 mg Nebulization Q4H PRN         alum & mag hydroxide-simethicone (MYLANTA ES/MAALOX  ES) suspension 30 mL  30 mL Oral Q4H PRN         aspirin EC " "tablet 81 mg  81 mg Oral Daily   81 mg at 12/08/19 0802     atorvastatin (LIPITOR) tablet 40 mg  40 mg Oral Daily   40 mg at 12/08/19 0802     calcium carbonate 600 mg-vitamin D 400 units (CALTRATE) per tablet 1 tablet  1 tablet Oral BID   1 tablet at 12/08/19 0802     celecoxib (celeBREX) capsule 200 mg  200 mg Oral BID   200 mg at 12/08/19 0802     finasteride (PROSCAR) tablet 5 mg  5 mg Oral Daily   5 mg at 12/08/19 0802     fluticasone-vilanterol (BREO ELLIPTA) 100-25 MCG/INH inhaler 1 puff  1 puff Inhalation Daily   1 puff at 12/08/19 1027     hydrOXYzine (ATARAX) tablet 25-50 mg  25-50 mg Oral Q4H PRN   50 mg at 12/07/19 1605     magnesium hydroxide (MILK OF MAGNESIA) suspension 30 mL  30 mL Oral At Bedtime PRN         metFORMIN (GLUCOPHAGE) tablet 500 mg  500 mg Oral BID w/meals   500 mg at 12/08/19 0802     nicotine (NICORETTE) gum 2-4 mg  2-4 mg Buccal Q1H PRN         OLANZapine (zyPREXA) injection 10 mg  10 mg Intramuscular TID PRN        Or     OLANZapine (zyPREXA) tablet 10 mg  10 mg Oral TID PRN   10 mg at 12/07/19 1605     omeprazole (priLOSEC) CR capsule 20 mg  20 mg Oral Daily   20 mg at 12/08/19 0802     tamsulosin (FLOMAX) capsule 0.8 mg  0.8 mg Oral Daily   0.8 mg at 12/08/19 0802     No current [facility]-ordered outpatient medications on file.              10 point ROS negative        Allergies:   No Known Allergies         Psychiatric Examination:   /76   Pulse 88   Temp 97.1  F (36.2  C) (Tympanic)   Resp 16   Ht 1.727 m (5' 8\")   Wt 109.6 kg (241 lb 11.2 oz)   SpO2 96%   BMI 36.75 kg/m    Weight is 241 lbs 11.2 oz  Body mass index is 36.75 kg/m .    Appearance:  awake, alert and slightly unkempt  Attitude:  guarded  Eye Contact:  poor  Mood:  irritable  Affect:  intensity is blunted  Speech:  decreased prosody  Psychomotor Behavior:  no evidence of tardive dyskinesia, dystonia, or tics  Thought Process:  evidence of thought blocking present  Associations:  no loose " associations  Thought Content:  hallcunatioins suspected. Suspected suicidal thoughts though he denies.   Insight:  limited  Judgment:  poor  Oriented to:  time, person, and place  Attention Span and Concentration:  limited  Recent and Remote Memory:  fair  Fund of Knowledge: low-normal  Muscle Strength and Tone: normal  Gait and Station: Normal           Labs:   None needed at this time           Plan:     Increase risperdal to 1.5 bid  On 72 hour hold. Preference is that he would stay beyond his hold. Today he appears that he may not want to do this though initially was stating he was interested in board and lodge.

## 2019-12-10 NOTE — PLAN OF CARE
BEHAVIORAL TEAM DISCUSSION     Participants: Jackie Barker NP, Edilma Flores NP,  Britt Raphael NP, Anahy Patel NP,Hunter Stearns NP, Arlette Mcgraw NP, Edith Hudson LSW,  Latanya Van LSW, Nik Cisse LSW, Wandy Birmingham OT, Edilma Collins OT, Faith Peoples OT  Progress: minimal, responding to internal stimuli. Isolative and withdrawn.  Continued Stay Criteria/Rationale: flat, delayed, thought blocking, hallucinations  Medical/Physical: diabetes  Precautions:   Falls precaution?: YES, care plan in place       Behavioral Orders   Procedures    Code 1 - Restrict to Unit    Routine Programming       As clinically indicated    Status 15       Every 15 minutes.      Plan: restarted Risperdal, discharge to a board and lodge when stable.  Rationale for change in precautions or plan: None     Active Problems:    Paranoia (H)      Current Facility-Administered Medications:     acetaminophen (TYLENOL) tablet 650 mg, 650 mg, Oral, Q4H PRN, Britt Raphael NP    albuterol (PROVENTIL) neb solution 2.5 mg, 2.5 mg, Nebulization, Q4H PRN, Jackie Barker NP    alum & mag hydroxide-simethicone (MYLANTA ES/MAALOX  ES) suspension 30 mL, 30 mL, Oral, Q4H PRN, Britt Raphael NP    aspirin EC tablet 81 mg, 81 mg, Oral, Daily, Jackie Bakrer NP, 81 mg at 12/10/19 0829    atorvastatin (LIPITOR) tablet 40 mg, 40 mg, Oral, Daily, Jackie Barker NP, 40 mg at 12/10/19 0821    calcium carbonate 600 mg-vitamin D 400 units (CALTRATE) per tablet 1 tablet, 1 tablet, Oral, BID, Jackie Barker NP, 1 tablet at 12/10/19 0822    celecoxib (celeBREX) capsule 200 mg, 200 mg, Oral, BID, Jackie Barker NP, 200 mg at 12/10/19 0821    finasteride (PROSCAR) tablet 5 mg, 5 mg, Oral, Daily, Jackie Barker NP, 5 mg at 12/10/19 0822    fluticasone-vilanterol (BREO ELLIPTA) 100-25 MCG/INH inhaler 1 puff, 1 puff, Inhalation, Daily, Mj, April Nikki, NP, 1 puff at 12/10/19 0744     hydrOXYzine (ATARAX) tablet 25-50 mg, 25-50 mg, Oral, Q4H PRN, Britt Raphael NP, 50 mg at 12/08/19 1815    magnesium hydroxide (MILK OF MAGNESIA) suspension 30 mL, 30 mL, Oral, At Bedtime PRN, Britt Raphael NP    metFORMIN (GLUCOPHAGE) tablet 500 mg, 500 mg, Oral, BID w/meals, Jackie Barker NP, 500 mg at 12/10/19 0822    nicotine (NICORETTE) gum 2-4 mg, 2-4 mg, Buccal, Q1H PRN, Britt Raphael NP    OLANZapine (zyPREXA) injection 10 mg, 10 mg, Intramuscular, TID PRN **OR** OLANZapine (zyPREXA) tablet 10 mg, 10 mg, Oral, TID PRN, Britt Raphale NP, 10 mg at 12/08/19 1815    omeprazole (priLOSEC) CR capsule 20 mg, 20 mg, Oral, Daily, Jackie Barker NP, 20 mg at 12/10/19 0821    risperiDONE (risperDAL) tablet 0.5 mg, 0.5 mg, Oral, BID, Jackie Barker NP, 0.5 mg at 12/10/19 0822    tamsulosin (FLOMAX) capsule 0.8 mg, 0.8 mg, Oral, Daily, Jackie Barker NP, 0.8 mg at 12/10/19 0821

## 2019-12-10 NOTE — PLAN OF CARE
Observed pt lying on his right side - eyes closed - non-labored breathing noted.   It is now 0655 and pt slept all noc  Without issue

## 2019-12-10 NOTE — PLAN OF CARE
"  Problem: Adult Behavioral Health Plan of Care  Goal: Patient-Specific Goal (Individualization)  Description  Patient will sleep 6-8 hours per night.  Patient will eat 50% of meals.   Patient will attend 50% of groups.  Patient will remain free from self harm or injury during hospital stay.   12/10/2019 1021 by Jada Riley, RN  Outcome: No Change  Note:     Pt isolates in bed. He is compliant with medications. He denies SI HI depression anxiety and pain he verbalized \"no\" to the above questions when asked about hallucinations he states \" yes\" but does not elaborate.      Problem: Adult Behavioral Health Plan of Care  Goal: Adheres to Safety Considerations for Self and Others  Outcome: No Change   No thoughts to harm self or others.   Problem: Adult Behavioral Health Plan of Care  Goal: Optimized Coping Skills in Response to Life Stressors  Outcome: No Change   He is encouraged to participate in unit programming  Problem: Suicidal Behavior  Goal: Suicidal Behavior is Absent or Managed  Description  Patient will be free from self harm and injury during hospital stay.     12/10/2019 1021 by Jada Riley, RN  Outcome: No Change   Denies SI at this time  Problem: Fall Injury Risk  Goal: Absence of Fall and Fall-Related Injury  Description  Patient will be free from injury or harm this hospital stay.   Patient will be free from falls during hospital stay.    12/10/2019 1021 by Jada Riley, RN  Outcome: Improving  No falls or injury noted   Problem: Cognitive Impairment (Psychotic Signs/Symptoms)  Goal: Improved Thought Clarity and Organization (Psychotic Signs/Symptoms)  Description  Patient will be able to participate in reality based conversation.    Outcome: No Change   Pt answered all questions appropriately with one word responses.   "

## 2019-12-11 PROCEDURE — 25000132 ZZH RX MED GY IP 250 OP 250 PS 637: Performed by: NURSE PRACTITIONER

## 2019-12-11 PROCEDURE — 12400000 ZZH R&B MH

## 2019-12-11 PROCEDURE — 99232 SBSQ HOSP IP/OBS MODERATE 35: CPT | Performed by: NURSE PRACTITIONER

## 2019-12-11 RX ORDER — RISPERIDONE 2 MG/1
2 TABLET ORAL 2 TIMES DAILY
Status: DISCONTINUED | OUTPATIENT
Start: 2019-12-11 | End: 2019-12-12 | Stop reason: HOSPADM

## 2019-12-11 RX ADMIN — OMEPRAZOLE 20 MG: 20 CAPSULE, DELAYED RELEASE ORAL at 08:57

## 2019-12-11 RX ADMIN — METFORMIN HYDROCHLORIDE 500 MG: 500 TABLET, FILM COATED ORAL at 16:31

## 2019-12-11 RX ADMIN — RISPERIDONE 2 MG: 2 TABLET ORAL at 20:16

## 2019-12-11 RX ADMIN — CELECOXIB 200 MG: 100 CAPSULE ORAL at 20:16

## 2019-12-11 RX ADMIN — NICOTINE POLACRILEX 2 MG: 2 GUM, CHEWING ORAL at 14:34

## 2019-12-11 RX ADMIN — ASPIRIN 81 MG: 81 TABLET, COATED ORAL at 09:05

## 2019-12-11 RX ADMIN — RISPERIDONE 1.5 MG: 1 TABLET ORAL at 08:57

## 2019-12-11 RX ADMIN — FLUTICASONE FUROATE AND VILANTEROL TRIFENATATE 1 PUFF: 100; 25 POWDER RESPIRATORY (INHALATION) at 09:20

## 2019-12-11 RX ADMIN — ATORVASTATIN CALCIUM 40 MG: 40 TABLET, FILM COATED ORAL at 08:57

## 2019-12-11 RX ADMIN — METFORMIN HYDROCHLORIDE 500 MG: 500 TABLET, FILM COATED ORAL at 08:57

## 2019-12-11 RX ADMIN — Medication 1 TABLET: at 08:57

## 2019-12-11 RX ADMIN — Medication 1 TABLET: at 20:16

## 2019-12-11 RX ADMIN — CELECOXIB 200 MG: 100 CAPSULE ORAL at 08:57

## 2019-12-11 RX ADMIN — NICOTINE POLACRILEX 4 MG: 2 GUM, CHEWING ORAL at 20:17

## 2019-12-11 RX ADMIN — HYDROXYZINE HYDROCHLORIDE 50 MG: 25 TABLET, FILM COATED ORAL at 20:16

## 2019-12-11 RX ADMIN — FINASTERIDE 5 MG: 5 TABLET, FILM COATED ORAL at 08:57

## 2019-12-11 RX ADMIN — TAMSULOSIN HYDROCHLORIDE 0.8 MG: 0.4 CAPSULE ORAL at 08:57

## 2019-12-11 ASSESSMENT — ACTIVITIES OF DAILY LIVING (ADL)
DRESS: SCRUBS (BEHAVIORAL HEALTH)
ORAL_HYGIENE: INDEPENDENT
HYGIENE/GROOMING: INDEPENDENT
HYGIENE/GROOMING: INDEPENDENT

## 2019-12-11 NOTE — PLAN OF CARE
Problem: Adult Behavioral Health Plan of Care  Goal: Patient-Specific Goal (Individualization)  Description  Patient will sleep 6-8 hours per night.  Patient will eat 50% of meals.   Patient will attend 50% of groups.  Patient will remain free from self harm or injury during hospital stay.   12/11/2019 1645 by Savanah Trejo RN  Outcome: Improving  Note:   Shift Summery:  VSS, denies pain. Denies SI, SIB, HI. Endorses auditory hallucinations but states they are much improved-declines to answer when asked what he is hearing. States he had terrible nightmares last night and had interrupted sleep.   Attends and participates in group, out in lounge this evening watching tv. Little to no interaction with peers. Cooperative with nursing assessment.      Problem: Suicidal Behavior  Goal: Suicidal Behavior is Absent or Managed  Description  Patient will be free from self harm and injury during hospital stay.     12/11/2019 1645 by Savanah Trejo RN  Outcome: Improving     Problem: Fall Injury Risk  Goal: Absence of Fall and Fall-Related Injury  Description  Patient will be free from injury or harm this hospital stay.   Patient will be free from falls during hospital stay.    12/11/2019 1645 by Savanah Trejo RN  Outcome: Improving     Problem: Cognitive Impairment (Psychotic Signs/Symptoms)  Goal: Improved Thought Clarity and Organization (Psychotic Signs/Symptoms)  Description  Patient will be able to participate in reality based conversation.    12/11/2019 1645 by Savanah Trejo RN  Outcome: No Change    Face to face end of shift report communicated to oncoming shift.     Savanah Trejo RN  12/11/2019  4:57 PM

## 2019-12-11 NOTE — PLAN OF CARE
"  Problem: Adult Behavioral Health Plan of Care  Goal: Patient-Specific Goal (Individualization)  Description  Patient will sleep 6-8 hours per night.  Patient will eat 50% of meals.   Patient will attend 50% of groups.  Patient will remain free from self harm or injury during hospital stay.   Outcome: No Change   Pt denies SI HI hallucinations at this time. He does admit to depression and anxiety ( by nodding head )but does not rate. He also denies having pain at this time.  He states that his sleep has been poor. He is encouraged to participate in unit programming.   Problem: Adult Behavioral Health Plan of Care  Goal: Adheres to Safety Considerations for Self and Others  Outcome: No Change   No thoughts to harm self or others  Problem: Adult Behavioral Health Plan of Care  Goal: Optimized Coping Skills in Response to Life Stressors  Outcome: No Change   Pt reprots that he did attend unit programming yesterday  Problem: Suicidal Behavior  Goal: Suicidal Behavior is Absent or Managed  Description  Patient will be free from self harm and injury during hospital stay.     Outcome: Improving  Pt has no thoughts to harm self at this time.   Problem: Fall Injury Risk  Goal: Absence of Fall and Fall-Related Injury  Description  Patient will be free from injury or harm this hospital stay.   Patient will be free from falls during hospital stay.    Outcome: Improving   No falls or injury noted at this time  Problem: Cognitive Impairment (Psychotic Signs/Symptoms)  Goal: Improved Thought Clarity and Organization (Psychotic Signs/Symptoms)  Description  Patient will be able to participate in reality based conversation.    Outcome: No Change   Pt thoughts still distorted. He has delayed responses and replies \"I don't know, I'm not sure, or I don't remember\"    When writer asked what happened for him to be admitted he thought about it and stated \" I was trying to get to [facility]\" when asked where he was he stated that he was " "hitch-hiking from [place]. And the next you know I am here.  He does not remember where he was or how he got to the hospital, he then stated \" I think the border patrol picked me up but I really don't know\"   When asked if he had any questions or concerns he stated \" No, but I would like new sheets\"     Pt hold  and he did reluctantly sign Voluntary rights.   "

## 2019-12-11 NOTE — PLAN OF CARE
Observed pt lying in a supine position - eyesl closed non-labored breathing - slept all noc without issue - approx 8 hours Face to face end of shift report communicated to oncoming staff.      Zhanna Garner RN  12/11/2019  7:06 AM

## 2019-12-11 NOTE — PROGRESS NOTES
[facility]  Psychiatric Progress Note      Impression:    Tobi Jeffery is a 54 year old  male who was brought in by the [place] police department to the [facility] emergency room after he was kicked out of detox. Symptoms related to Schizophrenia and lack of medications.    Pleasant when we spoke. Signed in voluntarily as hold . Would like to stay another couple days to get medications adjusted. Denied any s/e from medications at this time. Denied SI/hi and hallucinations. Ongoing reports of depression and anxiety as well as disruptive sleep. Nursing reported 8 hours of sleep, but he specified that he wakes frequently. Will increase Risperdal tonight to see if this helps.     We did talk about options for discharge once he is ready, including [facility] and possibly a voucher from [employer] for a local hotel as the weather is inclimate at this time.          DIagnoses:    Schizophrenia  Rule out schizoaffective disorder, bipolar type  Methamphetamine use dsiorder, moderate  Alcohol use disorder moderate to severe  Marijuana use disorder moderate to severe      Attestation:  Patient has been seen and evaluated by me,  Hunter Stearns NP          Interim History:   The patient's care was discussed with the treatment team and chart notes were reviewed.          Medications:     Current Facility-Administered Medications Ordered in Epic   Medication Dose Route Frequency Last Rate Last Dose     acetaminophen (TYLENOL) tablet 650 mg  650 mg Oral Q4H PRN         albuterol (PROVENTIL) neb solution 2.5 mg  2.5 mg Nebulization Q4H PRN         alum & mag hydroxide-simethicone (MYLANTA ES/MAALOX  ES) suspension 30 mL  30 mL Oral Q4H PRN         aspirin EC tablet 81 mg  81 mg Oral Daily   81 mg at 19 0802     atorvastatin (LIPITOR) tablet 40 mg  40 mg Oral Daily   40 mg at 19 0802     calcium carbonate 600 mg-vitamin D 400 units (CALTRATE) per tablet 1 tablet  1 tablet Oral BID   1 tablet at  "12/08/19 0802     celecoxib (celeBREX) capsule 200 mg  200 mg Oral BID   200 mg at 12/08/19 0802     finasteride (PROSCAR) tablet 5 mg  5 mg Oral Daily   5 mg at 12/08/19 0802     fluticasone-vilanterol (BREO ELLIPTA) 100-25 MCG/INH inhaler 1 puff  1 puff Inhalation Daily   1 puff at 12/08/19 1027     hydrOXYzine (ATARAX) tablet 25-50 mg  25-50 mg Oral Q4H PRN   50 mg at 12/07/19 1605     magnesium hydroxide (MILK OF MAGNESIA) suspension 30 mL  30 mL Oral At Bedtime PRN         metFORMIN (GLUCOPHAGE) tablet 500 mg  500 mg Oral BID w/meals   500 mg at 12/08/19 0802     nicotine (NICORETTE) gum 2-4 mg  2-4 mg Buccal Q1H PRN         OLANZapine (zyPREXA) injection 10 mg  10 mg Intramuscular TID PRN        Or     OLANZapine (zyPREXA) tablet 10 mg  10 mg Oral TID PRN   10 mg at 12/07/19 1605     omeprazole (priLOSEC) CR capsule 20 mg  20 mg Oral Daily   20 mg at 12/08/19 0802     tamsulosin (FLOMAX) capsule 0.8 mg  0.8 mg Oral Daily   0.8 mg at 12/08/19 0802     No current [facility]-ordered outpatient medications on file.              10 point ROS negative        Allergies:   No Known Allergies         Psychiatric Examination:   /79   Pulse 71   Temp 97.2  F (36.2  C) (Tympanic)   Resp 18   Ht 1.727 m (5' 8\")   Wt 109.6 kg (241 lb 11.2 oz)   SpO2 96%   BMI 36.75 kg/m    Weight is 241 lbs 11.2 oz  Body mass index is 36.75 kg/m .    Appearance: Awake, alert, in bed  Attitude:  Pleasant, a little guarded still  Eye Contact:  Fair, improved  Mood: cooperative, less irritable  Affect:  intensity is blunted  Speech:  Clear, coherent, minimal and not spontaneous  Psychomotor Behavior:  no evidence of tardive dyskinesia, dystonia, or tics  Thought Process:  evidence of thought blocking present  Associations:  no loose associations  Thought Content:  hallcunatioins suspected. Suspected suicidal thoughts though he denies.   Insight:  limited  Judgment:  poor  Oriented to:  time, person, and place  Attention Span and " Concentration:  limited  Recent and Remote Memory:  fair  Fund of Knowledge: low-normal  Muscle Strength and Tone: normal  Gait and Station: Normal           Labs:   None needed at this time           Plan:     Increase risperdal to 2 bid  Hold . Signed in voluntarily. Consider [facility] or [facility] shelter for discharge.

## 2019-12-11 NOTE — PLAN OF CARE
BEHAVIORAL TEAM DISCUSSION    Participants: Hunter Stearns NP, Leah Juarez LICSW, Edith Hudson LSW,  Latanya Van LSW, Nik Cisse LSW, Lucie Seaman [facility], Wandy Birmingham OT, Edilma Collins OT, Faith Peoples OT, Ruma Mak RN, India Arevalo RN   Progress: minimal   Continued Stay Criteria/Rationale: flat, delayed, thought blocking, hallucinations  Medical/Physical: diabetes  Precautions:   Falls precaution?: YES, care plan in place          Behavioral Orders   Procedures    Code 1 - Restrict to Unit    Routine Programming       As clinically indicated    Status 15       Every 15 minutes.      Plan: restarted Risperdal, continue to stabilize and work on discharge planning   Rationale for change in precautions or plan: None     Active Problems:    Paranoia (H)      Current Facility-Administered Medications:     acetaminophen (TYLENOL) tablet 650 mg, 650 mg, Oral, Q4H PRN, Britt Raphael NP    albuterol (PROVENTIL) neb solution 2.5 mg, 2.5 mg, Nebulization, Q4H PRN, Jackie Barker NP    alum & mag hydroxide-simethicone (MYLANTA ES/MAALOX  ES) suspension 30 mL, 30 mL, Oral, Q4H PRN, Britt Raphael NP    aspirin EC tablet 81 mg, 81 mg, Oral, Daily, Jackie Barker NP, 81 mg at 12/10/19 0829    atorvastatin (LIPITOR) tablet 40 mg, 40 mg, Oral, Daily, Jackie Barker NP, 40 mg at 12/10/19 0821    calcium carbonate 600 mg-vitamin D 400 units (CALTRATE) per tablet 1 tablet, 1 tablet, Oral, BID, Jackie Barker NP, 1 tablet at 12/10/19 0822    celecoxib (celeBREX) capsule 200 mg, 200 mg, Oral, BID, Jackie Barker NP, 200 mg at 12/10/19 0821    finasteride (PROSCAR) tablet 5 mg, 5 mg, Oral, Daily, Jackie Barker NP, 5 mg at 12/10/19 0822    fluticasone-vilanterol (BREO ELLIPTA) 100-25 MCG/INH inhaler 1 puff, 1 puff, Inhalation, Daily, Jackie Barkerette, NP, 1 puff at 12/10/19 0822    hydrOXYzine (ATARAX) tablet 25-50 mg, 25-50  mg, Oral, Q4H PRN, Britt Raphael NP, 50 mg at 12/08/19 1815    magnesium hydroxide (MILK OF MAGNESIA) suspension 30 mL, 30 mL, Oral, At Bedtime PRN, Britt Raphael NP    metFORMIN (GLUCOPHAGE) tablet 500 mg, 500 mg, Oral, BID w/meals, Jackie Barker NP, 500 mg at 12/10/19 0822    nicotine (NICORETTE) gum 2-4 mg, 2-4 mg, Buccal, Q1H PRN, Britt Raphael NP    OLANZapine (zyPREXA) injection 10 mg, 10 mg, Intramuscular, TID PRN **OR** OLANZapine (zyPREXA) tablet 10 mg, 10 mg, Oral, TID PRN, Britt Raphael NP, 10 mg at 12/08/19 1815    omeprazole (priLOSEC) CR capsule 20 mg, 20 mg, Oral, Daily, Jackie Barker NP, 20 mg at 12/10/19 0821    risperiDONE (risperDAL) tablet 0.5 mg, 0.5 mg, Oral, BID, Jackie Barker NP, 0.5 mg at 12/10/19 0822    tamsulosin (FLOMAX) capsule 0.8 mg, 0.8 mg, Oral, Daily, Jackie Barker NP, 0.8 mg at 12/10/19 0821

## 2019-12-12 VITALS
WEIGHT: 241.7 LBS | HEIGHT: 68 IN | RESPIRATION RATE: 18 BRPM | OXYGEN SATURATION: 95 % | DIASTOLIC BLOOD PRESSURE: 74 MMHG | TEMPERATURE: 97.2 F | BODY MASS INDEX: 36.63 KG/M2 | HEART RATE: 76 BPM | SYSTOLIC BLOOD PRESSURE: 120 MMHG

## 2019-12-12 LAB — GLUCOSE BLDC GLUCOMTR-MCNC: 108 MG/DL (ref 70–99)

## 2019-12-12 PROCEDURE — 25000132 ZZH RX MED GY IP 250 OP 250 PS 637: Performed by: NURSE PRACTITIONER

## 2019-12-12 PROCEDURE — 99239 HOSP IP/OBS DSCHRG MGMT >30: CPT | Performed by: NURSE PRACTITIONER

## 2019-12-12 PROCEDURE — 00000146 ZZHCL STATISTIC GLUCOSE BY METER IP

## 2019-12-12 RX ORDER — RISPERIDONE 2 MG/1
2 TABLET ORAL 2 TIMES DAILY
Qty: 60 TABLET | Refills: 0 | Status: SHIPPED | OUTPATIENT
Start: 2019-12-12

## 2019-12-12 RX ADMIN — RISPERIDONE 2 MG: 2 TABLET ORAL at 08:30

## 2019-12-12 RX ADMIN — OMEPRAZOLE 20 MG: 20 CAPSULE, DELAYED RELEASE ORAL at 08:35

## 2019-12-12 RX ADMIN — ASPIRIN 81 MG: 81 TABLET, COATED ORAL at 08:32

## 2019-12-12 RX ADMIN — FLUTICASONE FUROATE AND VILANTEROL TRIFENATATE 1 PUFF: 100; 25 POWDER RESPIRATORY (INHALATION) at 08:34

## 2019-12-12 RX ADMIN — NICOTINE POLACRILEX 2 MG: 2 GUM, CHEWING ORAL at 05:00

## 2019-12-12 RX ADMIN — CELECOXIB 200 MG: 100 CAPSULE ORAL at 08:30

## 2019-12-12 RX ADMIN — ATORVASTATIN CALCIUM 40 MG: 40 TABLET, FILM COATED ORAL at 08:30

## 2019-12-12 RX ADMIN — TAMSULOSIN HYDROCHLORIDE 0.8 MG: 0.4 CAPSULE ORAL at 08:29

## 2019-12-12 RX ADMIN — FINASTERIDE 5 MG: 5 TABLET, FILM COATED ORAL at 08:33

## 2019-12-12 RX ADMIN — NICOTINE POLACRILEX 4 MG: 2 GUM, CHEWING ORAL at 08:34

## 2019-12-12 RX ADMIN — Medication 1 TABLET: at 08:30

## 2019-12-12 RX ADMIN — METFORMIN HYDROCHLORIDE 500 MG: 500 TABLET, FILM COATED ORAL at 08:30

## 2019-12-12 NOTE — PLAN OF CARE
BEHAVIORAL TEAM DISCUSSION    Participants: Hunter Stearns NP, Leah Juarez LICSW, Edith Hudson LSW,  Latanya Van LSW, Nik Cisse LSW, Lucie Seaman [facility], Wandy Birmingham OT, Edilma Collins OT, Faith Peoples OT, Ruma Mak RN, Elsy Godinez RN   Progress: pleasant, reports  Hallucinations have improved, attending and participating in groups  Continued Stay Criteria/Rationale: still endorsing auditory hallucinations, nightmares, increase Risperdal to 2 bid  Medical/Physical: diabetes  Precautions:   Falls precaution?: YES, care plan in place          Behavioral Orders   Procedures     Code 1 - Restrict to Unit     Routine Programming       As clinically indicated     Status 15       Every 15 minutes.      Plan: restarted Risperdal, continue to stabilize - look at [facility] or [facility] for discharge - might discharge today  Rationale for change in precautions or plan: None     Active Problems:    Paranoia (H)        Current Facility-Administered Medications:      acetaminophen (TYLENOL) tablet 650 mg, 650 mg, Oral, Q4H PRN, Britt Raphael NP     albuterol (PROVENTIL) neb solution 2.5 mg, 2.5 mg, Nebulization, Q4H PRN, Jackie Barker NP     alum & mag hydroxide-simethicone (MYLANTA ES/MAALOX  ES) suspension 30 mL, 30 mL, Oral, Q4H PRN, Britt Raphael NP     aspirin EC tablet 81 mg, 81 mg, Oral, Daily, Jackie Barker NP, 81 mg at 12/12/19 0832     atorvastatin (LIPITOR) tablet 40 mg, 40 mg, Oral, Daily, Jackie Barker NP, 40 mg at 12/12/19 0830     calcium carbonate 600 mg-vitamin D 400 units (CALTRATE) per tablet 1 tablet, 1 tablet, Oral, BID, Jackie Barker NP, 1 tablet at 12/12/19 0830     celecoxib (celeBREX) capsule 200 mg, 200 mg, Oral, BID, Jackie Barker NP, 200 mg at 12/12/19 0830     finasteride (PROSCAR) tablet 5 mg, 5 mg, Oral, Daily, Jackie Barker NP, 5 mg at 12/12/19 0833     fluticasone-vilanterol  (BREO ELLIPTA) 100-25 MCG/INH inhaler 1 puff, 1 puff, Inhalation, Daily, Jackei Barker NP, 1 puff at 12/12/19 0834     hydrOXYzine (ATARAX) tablet 25-50 mg, 25-50 mg, Oral, Q4H PRN, Britt Raphael NP, 50 mg at 12/11/19 2016     magnesium hydroxide (MILK OF MAGNESIA) suspension 30 mL, 30 mL, Oral, At Bedtime PRN, Britt Raphael NP     metFORMIN (GLUCOPHAGE) tablet 500 mg, 500 mg, Oral, BID w/meals, Jackie Barker NP, 500 mg at 12/12/19 0830     nicotine (NICORETTE) gum 2-4 mg, 2-4 mg, Buccal, Q1H PRN, Britt Raphael NP, 4 mg at 12/12/19 0834     OLANZapine (zyPREXA) injection 10 mg, 10 mg, Intramuscular, TID PRN **OR** OLANZapine (zyPREXA) tablet 10 mg, 10 mg, Oral, TID PRN, Britt Raphael NP, 10 mg at 12/08/19 1815     omeprazole (priLOSEC) CR capsule 20 mg, 20 mg, Oral, Daily, Jackie Barker NP, 20 mg at 12/12/19 0835     risperiDONE (risperDAL) tablet 2 mg, 2 mg, Oral, BID, Hunter Stearns NP, 2 mg at 12/12/19 0830     tamsulosin (FLOMAX) capsule 0.8 mg, 0.8 mg, Oral, Daily, Jackie Barker NP, 0.8 mg at 12/12/19 0829

## 2019-12-12 NOTE — PLAN OF CARE
Observed pt lying on left side - eyes closed at 0030 - pt did get up at approximately 0400 and ask for and receive juice - we did discuss obtaining his blood sugar in the morning - pt was pleasant, polite and appropriate with staff and male peer in the lounge.  Obtained blood glucose - it was 108 - pt did relay he has many family members that have diabetes and have had lower limbs removed and his mother takes insulin.  Advised pt to obtain his own meter -

## 2019-12-12 NOTE — PLAN OF CARE
"  Problem: Adult Behavioral Health Plan of Care  Goal: Patient-Specific Goal (Individualization)  Description  Patient will sleep 6-8 hours per night.  Patient will eat 50% of meals.   Patient will attend 50% of groups.  Patient will remain free from self harm or injury during hospital stay.   Outcome: Improving  Note:     Pt reports feeling much better, he states \" It felt like I was on a bad acid trip\"  writer asked him if he did do any drugs, he hesitated and then said, \" ya I did some Dabs, thc and smoked some meth\"  he stated that he is feeling ready to go back home. He stated I have a lot of apologies to make.  I have no idea why I did this again.  He states he also lost his direct express card, but he did cancel it and they are already sending him a new one. Should come in the mail tomorrow. He states he would need to find transportation back home.  Writer did inform him that although he feels ready the NP may want him to stabilize longer before discharging. He then stated I guess another day or two wouldn't hurt.  He then stated that he needs to schedule with Tobi Tabor who took over for Dr Guzman, and needs to go see his primary Ana Hoskins, he hasn't been to the doctor in over 1 year.      Problem: Adult Behavioral Health Plan of Care  Goal: Adheres to Safety Considerations for Self and Others  Outcome: Improving   No thoughts to harm self or others  Problem: Adult Behavioral Health Plan of Care  Goal: Optimized Coping Skills in Response to Life Stressors  Outcome: Improving   Pt has been attending unit programming  Problem: Suicidal Behavior  Goal: Suicidal Behavior is Absent or Managed  Description  Patient will be free from self harm and injury during hospital stay.     Outcome: Improving   No thoughts of suicide at this time  Problem: Fall Injury Risk  Goal: Absence of Fall and Fall-Related Injury  Description  Patient will be free from injury or harm this hospital stay.   Patient will be free " from falls during hospital stay.    Outcome: Improving   No falls or injuries noted at this time  Problem: Cognitive Impairment (Psychotic Signs/Symptoms)  Goal: Improved Thought Clarity and Organization (Psychotic Signs/Symptoms)  Description  Patient will be able to participate in reality based conversation.    Outcome: Improving   Patients thoughts are reality based, linear and clear. No delusions or paranoia noted.     Discharge Note    Patient Discharged to home on 12/12/2019 2:05 PM via [employer] accompanied by .     Patient informed of discharge instructions in AVS. patient verbalizes understanding and denies having any questions pertaining to AVS. Patient stable at time of discharge. Patient denies SI, HI, and thoughts of self harm at time of discharge. All personal belongings returned to patient. Discharge prescriptions sent to [facility] [place] via electronic communication. Psych evaluation, history and physical, AVS, and discharge summary faxed to next level of care- unit SW.     Jada Riley RN  12/12/2019  2:05 PM

## 2019-12-12 NOTE — PLAN OF CARE
"Spoke with pt this morning - pt states he is wanting to discharge today states he was using \"dabs, pot and meth\" a \"day or two before coming here\" pt states he is not suicidal, not homicidal and denies AH/VH, but reported his sleep is not that great. Patient reported he has a \"house [place] ren[place]\" denies this being an unsafe place to discharge to - pt said he does not plan to use again and is interested in getting set back up with a med provider and therapist at [facility] [place] and a follow up PCP appointment for diabetic education as pt reported prior to admission \"I was careless with my med box before and I know I need to be more careful before I would just leave it some where where I thought it was safe, but I guess it wasn't because my meds would be gone when I went to take them\"   "

## 2019-12-12 NOTE — DISCHARGE INSTRUCTIONS
Behavioral Discharge Planning and Instructions    Summary: Tobi was admitted to  with increased mental health symptoms     Main Diagnosis: schizophrenia, Rule out schizoaffective disorder, bipolar type, Methamphetamine use dsiorder, moderate, Alcohol use disorder moderate to severe, Marijuana use disorder moderate to severe    Major Treatments, Procedures and Findings: Stabilize with medications, connect with community programs.    Symptoms to Report: feeling more aggressive, increased confusion, losing more sleep, mood getting worse or thoughts of suicide    Lifestyle Adjustment: Take all medications as prescribed, meet with doctor/ medication provider, out patient therapist, , and [facility] worker as scheduled. Abstain from alcohol or any unprescribed drugs.    Psychiatry Follow-up:     [facility]   Med management - Edilma MCFARLAND - February 19th @ 10:30   Therapy - Tobi Samayoa - January 3rd @ 9:00 (placed on cancellation list)   [address]   [place] [address]  Phone: [phone]   Fax: [phone]     [facility]   PCP - Dr. Rordíguez - December 16th @ 9:25   [facility] [place] [address]   Phone: [phone]  Fax: [phone]     Resources:   Crisis Intervention: 604.210.6852 or 799-537-2136 (TTY: [phone]).  Call anytime for help.  National [place] on Mental Illness (www.[place].caitlin.org): 285.101.2009 or 927-284-3774.  Alcoholics Anonymous (www.alcoholics-anonymous.org): Check your phone book for your local chapter.  Suicide Awareness Voices of Education (SAVE) (www.save.org): 520-785-HKXS (5183)  National Suicide Prevention Line (www.mentalhealthmn.org): 006-584-NVVR (3581)  Mental Health Consumer/Survivor Network of [place] (www.mhcsn.net): 196.734.6671 or 688-837-1010  Mental Health Association of [place] (www.mentalhealth.org): 458.814.6423 or 160-760-8549    General Medication Instructions:   See your medication sheet(s) for instructions.    Take all medicines as directed.  Make no changes unless your doctor suggests them.   Go to all your doctor visits.  Be sure to have all your required lab tests. This way, your medicines can be refilled on time.  Do not use any drugs not prescribed by your doctor.  Avoid alcohol.

## 2019-12-12 NOTE — DISCHARGE SUMMARY
Psychiatric Discharge Summary    Tobi Jeffery MRN# [id number]   Age: 54 year old YOB: 1965     Date of Admission:  12/6/2019  Date of Discharge:  12/12/2019  Admitting Physician:  Stas Garcia MD  Discharge Physician:  Hunter Stearns NP          Event Leading to Hospitalization and Hospital Stay   Per HPI as completed by KI Hill CNP on 12/7/2019:  Tobi Jeffery is a 54 year old  male who was brought in by the [place] police department to the [facility] emergency room after he was kicked out of detox today.  He was initially brought to detox after police have found him walking from [place] to [place] a few days ago.  Detox believed that his symptoms were secondary to schizophrenia and he has a known diagnosis of this and not due to any intoxication or withdrawal of substance or alcohol.  He had apparently been seeing a local psychiatrist who retired last year and has not been compliant with any of his medications.  He states he does not want to restart his medications because they make his symptoms worse.     He was recently in the [facility] emergency room within the past 2 days due to responding to hallucinationsThough it does not appear that he was admitted anywhere.  The [facility] emergency room physician thought there was a possibility that was methamphetamine intoxication though he denies any methamphetamine use.      He was cooperative in the [place] emergency room though was appearing very preoccupied.     When I met with Tobi he laid in bed and almost made no eye contact with me.  He stared straight at the ceiling while he was laying in bed.  He appeared to have thought blocking though was able to answer some questions in complete sentence though this did not occur frequently.  He did not initiate any conversation at all.  He did not ask me any questions or contribute to any part of the conversation.  Staff did tell me that he had not eaten since he has  "been here in his breakfast was still sitting his side table.  I asked him if he was hearing voices telling him not to eat and he said that he was.  I asked him if he was hearing voices telling him to kill himself and he said that he was.  He denies having intent to harm himself.  At one point he does state \"I am not doing too well\" he could not elaborate on this at all.  He is extremely blunted.  I asked him if he had like the past psychiatrist that he had seen.  It appears that he did not follow-up with anyone else after this psychiatrist retired.  He states \"yeah he was all right\" I asked if he like the medications he was on when he saw that psychiatrist and he said yes.  I asked if we could restart those medications he was on in the past and he said \"no they made me worse\" I asked if they made his voices seem worse and he stated yes.  It appears at one point he was on Wellbutrin which could have contributed to her worsening of the voices.  It appears he was also on Risperdal and Seroquel though he has not taken them in quite some time at least a year.  I asked him if he believes the voices that he is hearing could be secondary to mental illness and he stated \"I am not sure\".  He does state that he drinks alcohol though when I asked him how frequently he drinks he does not answer.  I asked him if he drinks at least weekly and he says yes.  His liver enzymes are elevated and he states that he has never been diagnosed with hepatitis when I asked him.  I asked if his liver enzymes could be elevated from alcohol and he says \"maybe\".  I asked him if he could think of any way that I could help him and he said \"no you can help me\" I said that there is a possibility medications could help some of his symptoms such as the voices and he said that there is \"nothing that can help me\" he looks quite anxious and preoccupied when he stares at the ceiling.  He denies any thoughts to harm other people.          Stay:  Admitted to " "unit on a 72 hour hold. He did sign in voluntarily once the hold . Provided a safe environment and therapeutic milieu. Resumed Risperdal and adjusted as tolerated. Once he cleared, he did admit to polysubstance abuse involving \"dabs,\" marijuana and methamphetamine. No drug screen was completed at time of admission. He does rent an apartment with a roommate in [place] and is wanting to return home. Agreeable to outpatient services and follow up.    At time of discharge, there is no evidence that patient is in immediate danger of self or others.        DIagnoses:     Unspecified psychotic disorder  Rule out schizophrenia  Rule out schizoaffective disorder, bipolar type  Alcohol use disorder moderate to severe  Marijuana use disorder moderate to severe         Labs:     Results for orders placed or performed during the hospital encounter of 19   Glucose by meter     Status: Abnormal   Result Value Ref Range    Glucose 121 (H) 70 - 99 mg/dL   Glucose by meter     Status: Abnormal   Result Value Ref Range    Glucose 120 (H) 70 - 99 mg/dL   Glucose by meter     Status: Abnormal   Result Value Ref Range    Glucose 125 (H) 70 - 99 mg/dL   Glucose by meter     Status: Abnormal   Result Value Ref Range    Glucose 108 (H) 70 - 99 mg/dL          Discharge Medications:     Current Discharge Medication List      CONTINUE these medications which have CHANGED    Details   risperiDONE (RISPERDAL) 2 MG tablet Take 1 tablet (2 mg) by mouth 2 times daily  Qty: 60 tablet, Refills: 0    Associated Diagnoses: Paranoia (H)         CONTINUE these medications which have NOT CHANGED    Details   aspirin 81 MG EC tablet Take 81 mg by mouth daily      atorvastatin (LIPITOR) 40 MG tablet Take 40 mg by mouth daily      calcium carbonate 600 mg-vitamin D 400 units (CALTRATE) 600-400 MG-UNIT per tablet Take 1 tablet by mouth 2 times daily      celecoxib (CELEBREX) 200 MG capsule Take 200 mg by mouth 2 times daily    "   finasteride (PROSCAR) 5 MG tablet Take 5 mg by mouth daily      metFORMIN (GLUCOPHAGE) 500 MG tablet Take 500 mg by mouth 2 times daily (with meals)      omeprazole (PRILOSEC) 20 MG DR capsule Take 20 mg by mouth daily      tamsulosin (FLOMAX) 0.4 MG capsule Take 0.8 mg by mouth daily      albuterol (PROAIR HFA/PROVENTIL HFA/VENTOLIN HFA) 108 (90 Base) MCG/ACT inhaler Inhale 2 puffs into the lungs every 4 hours as needed for shortness of breath / dyspnea    Comments: Pharmacy may dispense brand covered by insurance (Proair, or proventil or ventolin or generic albuterol inhaler)      fluticasone-vilanterol (BREO ELLIPTA) 100-25 MCG/INH inhaler Inhale 1 puff into the lungs daily         STOP taking these medications       buPROPion (WELLBUTRIN XL) 300 MG 24 hr tablet Comments:   Reason for Stopping:         ciprofloxacin (CIPRO) 500 MG tablet Comments:   Reason for Stopping:         docusate sodium (COLACE) 100 MG capsule Comments:   Reason for Stopping:         mirtazapine (REMERON) 30 MG tablet Comments:   Reason for Stopping:         QUEtiapine (SEROQUEL) 200 MG tablet Comments:   Reason for Stopping:         QUEtiapine (SEROQUEL) 300 MG tablet Comments:   Reason for Stopping:                  Psychiatric Examination:   Appearance:  awake, alert  Attitude:  cooperative  Eye Contact:  better  Mood:  better  Affect:  intensity is blunted  Speech:  clear, coherent  Psychomotor Behavior:  no evidence of tardive dyskinesia, dystonia, or tics  Thought Process:  logical and goal oriented  Associations:  no loose associations  Thought Content:  no evidence of suicidal ideation or homicidal ideation  Insight:  limited but somewhat improved  Judgment:  fair  Oriented to:  time, person, and place  Attention Span and Concentration:  fair  Recent and Remote Memory:  intact, improved  Fund of Knowledge: appropriate  Muscle Strength and Tone: normal  Gait and Station: Normal         Discharge Plan:   Discharge back to  [place]. Medication changes sent to [facility] in [place], [place].     Psychiatry Follow-up:      [facility]   Med management - Edilma MCFARLAND - February 19th @ 10:30   Therapy - Tobi Samayoa - January 3rd @ 9:00 (placed on cancellation list)   [address]   [place] [address]  Phone: [phone]   Fax: [phone]      [facility]   PCP - Dr. Rodríguez - December 16th @ 9:25   [address] [place] [address]   Phone: [phone]  Fax: [phone]       Attestation:  The patient has been seen and evaluated by me,  Hunter Stearns NP         Discharge Services Provided:    35 minutes spent on discharge services, including:  Final examination of patient.  Review and discussion of Hospital stay.  Instructions for continued outpatient care/goals.  Preparation of discharge records.  Preparation of medications refills and new prescriptions.  Preparation of Applicable referral forms.